# Patient Record
Sex: FEMALE | Race: WHITE | Employment: PART TIME | URBAN - METROPOLITAN AREA
[De-identification: names, ages, dates, MRNs, and addresses within clinical notes are randomized per-mention and may not be internally consistent; named-entity substitution may affect disease eponyms.]

---

## 2017-05-16 ENCOUNTER — TRANSFERRED RECORDS (OUTPATIENT)
Dept: HEALTH INFORMATION MANAGEMENT | Facility: CLINIC | Age: 52
End: 2017-05-16

## 2018-01-31 ENCOUNTER — TRANSFERRED RECORDS (OUTPATIENT)
Dept: HEALTH INFORMATION MANAGEMENT | Facility: CLINIC | Age: 53
End: 2018-01-31

## 2018-07-30 LAB — MAMMOGRAM: NORMAL

## 2019-04-10 ENCOUNTER — TRANSFERRED RECORDS (OUTPATIENT)
Dept: HEALTH INFORMATION MANAGEMENT | Facility: CLINIC | Age: 54
End: 2019-04-10

## 2019-04-17 ENCOUNTER — TRANSFERRED RECORDS (OUTPATIENT)
Dept: HEALTH INFORMATION MANAGEMENT | Facility: CLINIC | Age: 54
End: 2019-04-17

## 2019-09-11 ENCOUNTER — TELEPHONE (OUTPATIENT)
Dept: DERMATOLOGY | Facility: CLINIC | Age: 54
End: 2019-09-11

## 2019-09-11 NOTE — TELEPHONE ENCOUNTER
[facility] Call Center    Phone Message    May a detailed message be left on voicemail: yes    Reason for Call: Other: pt sent an email to request an appointment with  Dr. Webb  I am requesting a visit with a dermatologist who can serve both the skin and hair.  Current diagnosis lichen planopilaris for which I am taking Cellcept.  Will need to check labs for that prescription.  I can share that with you.    I am doing a share code with [employer] at [facility] but it is limited time? Share Code [id number]. May need to figure out another way to share those tests with you.    Had Basil Cell Carcinoma years ago.  Needing a skin check annual.  Action Taken: Message routed to:  [facility]

## 2019-09-20 NOTE — TELEPHONE ENCOUNTER
FUTURE VISIT INFORMATION      FUTURE VISIT INFORMATION:    Date: 10.29.19    Time: 9:25    Location:  Derm  REFERRAL INFORMATION:    Referring provider: Dr. Zackery Hernandez    Referring providers clinic: [facility]    Reason for visit/diagnosis:  hairloss, possible skin check Hx BCC    RECORDS REQUESTED FROM:       Clinic name Comments Records Status Imaging Status   [facility] Dr. Zackery Hernandez Received (sent for scanning) Also a copy is in today's folder in the collaboration space    [facility] 7.20.18 Dr. Bharath Louie/Dr. Seema Valencia In Epic/CE                                Action Angela Delgado 9.20.19 3:35 pm   Action Taken Faxed records request to [facility]     Action Angela Delgado 10.28.19 11:35 am   Action Taken Called [facility] as records had not yet been received. They faxed records to me and I sent them to [facility] for scanning into chart.

## 2019-09-30 ENCOUNTER — DOCUMENTATION ONLY (OUTPATIENT)
Dept: CARE COORDINATION | Facility: CLINIC | Age: 54
End: 2019-09-30

## 2019-10-29 ENCOUNTER — PRE VISIT (OUTPATIENT)
Dept: DERMATOLOGY | Facility: CLINIC | Age: 54
End: 2019-10-29

## 2019-10-29 ENCOUNTER — OFFICE VISIT (OUTPATIENT)
Dept: DERMATOLOGY | Facility: CLINIC | Age: 54
End: 2019-10-29
Payer: COMMERCIAL

## 2019-10-29 DIAGNOSIS — L65.9 LOSS OF HAIR: ICD-10-CM

## 2019-10-29 DIAGNOSIS — Z79.899 ENCOUNTER FOR LONG-TERM (CURRENT) USE OF HIGH-RISK MEDICATION: ICD-10-CM

## 2019-10-29 DIAGNOSIS — L66.12 FRONTAL FIBROSING ALOPECIA: Primary | ICD-10-CM

## 2019-10-29 DIAGNOSIS — Z85.828 HISTORY OF NONMELANOMA SKIN CANCER: ICD-10-CM

## 2019-10-29 LAB
ALBUMIN SERPL-MCNC: 3.8 G/DL (ref 3.4–5)
ALP SERPL-CCNC: 59 U/L (ref 40–150)
ALT SERPL W P-5'-P-CCNC: 21 U/L (ref 0–50)
ANION GAP SERPL CALCULATED.3IONS-SCNC: 2 MMOL/L (ref 3–14)
AST SERPL W P-5'-P-CCNC: 14 U/L (ref 0–45)
BASOPHILS # BLD AUTO: 0 10E9/L (ref 0–0.2)
BASOPHILS NFR BLD AUTO: 0.5 %
BILIRUB SERPL-MCNC: 0.5 MG/DL (ref 0.2–1.3)
BUN SERPL-MCNC: 9 MG/DL (ref 7–30)
CALCIUM SERPL-MCNC: 8.6 MG/DL (ref 8.5–10.1)
CHLORIDE SERPL-SCNC: 105 MMOL/L (ref 94–109)
CO2 SERPL-SCNC: 31 MMOL/L (ref 20–32)
CREAT SERPL-MCNC: 0.74 MG/DL (ref 0.52–1.04)
DIFFERENTIAL METHOD BLD: ABNORMAL
EOSINOPHIL # BLD AUTO: 0 10E9/L (ref 0–0.7)
EOSINOPHIL NFR BLD AUTO: 0.8 %
ERYTHROCYTE [DISTWIDTH] IN BLOOD BY AUTOMATED COUNT: 12.1 % (ref 10–15)
FERRITIN SERPL-MCNC: 95 NG/ML (ref 8–252)
GFR SERPL CREATININE-BSD FRML MDRD: >90 ML/MIN/{1.73_M2}
GLUCOSE SERPL-MCNC: 87 MG/DL (ref 70–99)
HCT VFR BLD AUTO: 45.8 % (ref 35–47)
HGB BLD-MCNC: 14.9 G/DL (ref 11.7–15.7)
IMM GRANULOCYTES # BLD: 0 10E9/L (ref 0–0.4)
IMM GRANULOCYTES NFR BLD: 0 %
LYMPHOCYTES # BLD AUTO: 1.3 10E9/L (ref 0.8–5.3)
LYMPHOCYTES NFR BLD AUTO: 34.3 %
MCH RBC QN AUTO: 30.7 PG (ref 26.5–33)
MCHC RBC AUTO-ENTMCNC: 32.5 G/DL (ref 31.5–36.5)
MCV RBC AUTO: 94 FL (ref 78–100)
MONOCYTES # BLD AUTO: 0.4 10E9/L (ref 0–1.3)
MONOCYTES NFR BLD AUTO: 9.7 %
NEUTROPHILS # BLD AUTO: 2 10E9/L (ref 1.6–8.3)
NEUTROPHILS NFR BLD AUTO: 54.7 %
NRBC # BLD AUTO: 0 10*3/UL
NRBC BLD AUTO-RTO: 0 /100
PLATELET # BLD AUTO: 228 10E9/L (ref 150–450)
POTASSIUM SERPL-SCNC: 3.9 MMOL/L (ref 3.4–5.3)
PROT SERPL-MCNC: 6.7 G/DL (ref 6.8–8.8)
RBC # BLD AUTO: 4.85 10E12/L (ref 3.8–5.2)
SODIUM SERPL-SCNC: 138 MMOL/L (ref 133–144)
WBC # BLD AUTO: 3.7 10E9/L (ref 4–11)

## 2019-10-29 RX ORDER — MYCOPHENOLATE MOFETIL 500 MG/1
TABLET ORAL
Refills: 1 | COMMUNITY
Start: 2019-04-12

## 2019-10-29 RX ORDER — MYCOPHENOLATE MOFETIL 500 MG/1
1000 TABLET, FILM COATED ORAL 2 TIMES DAILY
Qty: 240 TABLET | Refills: 3 | Status: SHIPPED | OUTPATIENT
Start: 2019-10-29 | End: 2019-12-28

## 2019-10-29 RX ORDER — DESONIDE 0.5 MG/G
CREAM TOPICAL 2 TIMES DAILY
Qty: 15 G | Refills: 1 | Status: SHIPPED | OUTPATIENT
Start: 2019-10-29

## 2019-10-29 RX ORDER — ESTRADIOL 0.04 MG/D
PATCH TRANSDERMAL
COMMUNITY
Start: 2019-10-28

## 2019-10-29 RX ORDER — CLOBETASOL PROPIONATE 0.5 MG/ML
SOLUTION TOPICAL
Refills: 11 | COMMUNITY
Start: 2018-12-07

## 2019-10-29 RX ORDER — TRETINOIN 0.25 MG/G
CREAM TOPICAL
Qty: 45 G | Refills: 3 | Status: SHIPPED | OUTPATIENT
Start: 2019-10-29

## 2019-10-29 ASSESSMENT — PAIN SCALES - GENERAL: PAINLEVEL: NO PAIN (0)

## 2019-10-29 NOTE — NURSING NOTE
Chief Complaint   Patient presents with     Hair/Scalp Problem     Salud is here today for Hairloss. She is currently takign Cellcept for her hairloss.      Carie Alvarez LPN

## 2019-10-29 NOTE — PATIENT INSTRUCTIONS
Alternate cellcept dose to 1000 mg (two tablets) twice per day, then 2 tablets (1000 mg) in the morning and 1 tablet (500mg) at night. Do this for 2 weeks, then if continue to do well, can decrease to 1000 mg (two tablets) in the morning and one at night. You can continue to remove one tablet every other day every 2 weeks as tolerated.  If you start to develop a flare in your FFA, please increase your dose most recent dose increase.    Apply a thin desonide cream to eyebrows once per day  Apply tretinoin to forehead and face, pea-sized amount every other night and then nightly as tolerated.

## 2019-10-29 NOTE — LETTER
10/29/2019       RE: Salud Rock  [address] [place] [address]     Dear Colleague,    Thank you for referring your patient, Salud Rock, to the [facility] DERMATOLOGY at [facility]. Please see a copy of my visit note below.    [facility] Dermatology Note    Dermatology Problem List:  1. Frontal fibrosing alopecia  -current treatment: CellCept 1000mg BID (with plan to taper 1 tablet every other day [id number]), ketoconazole shampoo 3x per week, desonide cream to eyebrows daily, and tretinoin 0.025% cream nightly   -CellCept safety labs obtained 10/29/19  2. Hx BCC on face    Encounter Date: Oct 29, 2019    CC:   Chief Complaint   Patient presents with     Hair/Scalp Problem     Salud is here today for Hairloss. She is currently takign Cellcept for her hairloss.      History of Present Illness:  Ms. Salud Rock is a 53 year old female who presents for evaluation of her frontal fibrosing alopecia. She was previously seen by Dr. Moreno [facility] and presents today to establish care and for monitoring/management of her CellCept.  She initially noticed her hair loss approximately  4-5 years ago with her frontal scalp hairline and pubic hair becoming very pruritic and red. At first, she thought she may be allergic to her family's Earlene tree, but when this did not resolve, she presented to her dermatologist for evaluation. A punch bx was performed, and she was diagnosed with LPP.  She is currently treating her hair loss with ketoconazole shampoo every other day and CellCept 1,000 mg BID.  Since starting the cellcept, her scalp pruritus has significantly improved. She continues to intermittently experience pruritus, but denies any scalp pain or burning.  Over the past few years, she has developed thinning along the lateral aspect of her eyebrows. She denies hair loss on eyelashes or body hair elsewhere.     She states that she has a history of  a BCC and would like to have a skin check either today or within the next few months. Today, she denies any new, painful, non-healing or bleeding lesions of concern she would like to address today.     Past Medical History:   There is no problem list on file for this patient.    No past medical history on file.  No past surgical history on file.    Social History:  Patient reports that she has never smoked. She has never used smokeless tobacco.    Family History:  No family history on file.    Medications:  Current Outpatient Medications   Medication Sig Dispense Refill     CELLCEPT (BRAND) 500 MG tablet Take 2 tablets (1,000 mg) by mouth 2 times daily 240 tablet 3     cholecalciferol (D 5000) 125 MCG (5000 UT) CAPS Take 2,000 Units by mouth       clobetasol (TEMOVATE) 0.05 % external solution APPLY TOPICALLY TO SCALP TWO TIMES A DAY  11     desonide (DESOWEN) 0.05 % external cream Apply topically 2 times daily 15 g 1     estradiol (CLIMARA) 0.0375 MG/24HR weekly patch        mycophenolate (GENERIC EQUIVALENT) 500 MG tablet TAKE TWO TABLETS BY MOUTH TWICE A DAY  1     OMEGA 3-6-9 FATTY ACIDS PO Take 1 capsule by mouth       tretinoin (RETIN-A) 0.025 % external cream Use a pea-sized amount every night 45 g 3        No Known Allergies    Review of Systems:  -As per HPI  -Constitutional: Otherwise feeling well today, in usual state of health.  -HEENT: Patient denies nonhealing oral sores.  -Skin: As above in HPI. No additional skin concerns.    Physical exam:  Vitals: There were no vitals taken for this visit.  GEN: This is a well developed, well-nourished female in no acute distress, in a pleasant mood.    SKIN: Focused examination of the face, scalp and b/l fingernails was performed.  -Prado skin type: I  -recession of frontotemporal scalp hairline   -perifollicular scale and dilation of the follicular ostia along the frontal scalp hairline with minimal perifollicular erythema  -decreased hair density present  along lateral aspect of b/l eyebrows with light pink patches present along the b/l eyebrows  -no hair loss loss present on eyelashes  -mild longitudinal ridging present along b/l fingernails, most prominent along the b/l thumb nails  -No other lesions of concern on areas examined.     Impression/Plan:  1. Frontal fibrosing alopecia.  LPPAI score 0.33 today for perifollicular scale. Discussed with Ms. Rock the similar appearance under the microscope between FFA and LPP. The clinical distribution of the hair loss and involvement helps to distinguish between these two diseases. Given that her hair loss is predominantly along her frontotemporal scalp with involvement of her eyebrows, her disease is clinically consistent with FFA.    Will continue CellCept as her FFA is well controlled with this medication. Will plan to taper this medication as tolerated 1 tablet, every other day Q2 weeks (I.e 1000mg BID alternating with 1000mg in the AM and 500mg in the PM). Last cellcept safety labs checked on 4/2019, will repeat safety labs today    Continue to wash scalp with ketoconazole shampoo 3x per week    Apply desonide 0.05% cream to b/l eyebrows 1-2x per day    Apply tretinoin 0.025% cream to b/l eyebrows nightly as tolerated    2. History of nonmelanoma skin cancer, BCC on the face    Discussed with  that we will focus on her hair loss today and plan to perform a skin check at her next visit.     Sun precaution was advised including the use of sun screens of SPF 30 or higher, sun protective clothing, and avoidance of tanning beds    CC Zackery Hernandez MD  [facility]  [address] [place] [phone] on close of this encounter.    Follow-up in 6 months, earlier for new or changing lesions.     Dr. Peace staffed the patient.    Staff Involved:  Resident(Dr. Chandler)/Staff(Dr. Peace)    Staff Physician Comments:   I saw and evaluated the patient with the resident and I agree with  the assessment and plan.  I was present for the examination. I have made edits if needed.    Carlos Peace MD  Staff Dermatologist and Dermatopathologist  , Department of Dermatology

## 2019-10-29 NOTE — PROGRESS NOTES
[facility] Dermatology Note    Dermatology Problem List:  1. Frontal fibrosing alopecia  -current treatment: CellCept 1000mg BID (with plan to taper 1 tablet every other day [id number]), ketoconazole shampoo 3x per week, desonide cream to eyebrows daily, and tretinoin 0.025% cream nightly   -CellCept safety labs obtained 10/29/19  2. Hx BCC on face    Encounter Date: Oct 29, 2019    CC:   Chief Complaint   Patient presents with     Hair/Scalp Problem     Salud is here today for Hairloss. She is currently takign Cellcept for her hairloss.      History of Present Illness:  Ms. Salud Rock is a 53 year old female who presents for evaluation of her frontal fibrosing alopecia. [facility] and presents today to establish care and for monitoring/management of her CellCept.  She initially noticed her hair loss approximately  4-5 years ago with her frontal scalp hairline and pubic hair becoming very pruritic and red. At first, she thought she may be allergic to her family's Earlene tree, but when this did not resolve, she presented to her dermatologist for evaluation. A punch bx was performed, and she was diagnosed with LPP.  She is currently treating her hair loss with ketoconazole shampoo every other day and CellCept 1,000 mg BID.  Since starting the cellcept, her scalp pruritus has significantly improved. She continues to intermittently experience pruritus, but denies any scalp pain or burning.  Over the past few years, she has developed thinning along the lateral aspect of her eyebrows. She denies hair loss on eyelashes or body hair elsewhere.     She states that she has a history of a BCC and would like to have a skin check either today or within the next few months. Today, she denies any new, painful, non-healing or bleeding lesions of concern she would like to address today.     Past Medical History:   There is no problem list on file for this  patient.    No past medical history on file.  No past surgical history on file.    Social History:  Patient reports that she has never smoked. She has never used smokeless tobacco.    Family History:  No family history on file.    Medications:  Current Outpatient Medications   Medication Sig Dispense Refill     CELLCEPT (BRAND) 500 MG tablet Take 2 tablets (1,000 mg) by mouth 2 times daily 240 tablet 3     cholecalciferol (D 5000) 125 MCG (5000 UT) CAPS Take 2,000 Units by mouth       clobetasol (TEMOVATE) 0.05 % external solution APPLY TOPICALLY TO SCALP TWO TIMES A DAY  11     desonide (DESOWEN) 0.05 % external cream Apply topically 2 times daily 15 g 1     estradiol (CLIMARA) 0.0375 MG/24HR weekly patch        mycophenolate (GENERIC EQUIVALENT) 500 MG tablet TAKE TWO TABLETS BY MOUTH TWICE A DAY  1     OMEGA 3-6-9 FATTY ACIDS PO Take 1 capsule by mouth       tretinoin (RETIN-A) 0.025 % external cream Use a pea-sized amount every night 45 g 3        No Known Allergies    Review of Systems:  -As per HPI  -Constitutional: Otherwise feeling well today, in usual state of health.  -HEENT: Patient denies nonhealing oral sores.  -Skin: As above in HPI. No additional skin concerns.    Physical exam:  Vitals: There were no vitals taken for this visit.  GEN: This is a well developed, well-nourished female in no acute distress, in a pleasant mood.    SKIN: Focused examination of the face, scalp and b/l fingernails was performed.  -Prado skin type: I  -recession of frontotemporal scalp hairline   -perifollicular scale and dilation of the follicular ostia along the frontal scalp hairline with minimal perifollicular erythema  -decreased hair density present along lateral aspect of b/l eyebrows with light pink patches present along the b/l eyebrows  -no hair loss loss present on eyelashes  -mild longitudinal ridging present along b/l fingernails, most prominent along the b/l thumb nails  -No other lesions of concern on  areas examined.     Impression/Plan:  1. Frontal fibrosing alopecia.  LPPAI score 0.33 today for perifollicular scale. Discussed with Ms. Rock the similar appearance under the microscope between FFA and LPP. The clinical distribution of the hair loss and involvement helps to distinguish between these two diseases. Given that her hair loss is predominantly along her frontotemporal scalp with involvement of her eyebrows, her disease is clinically consistent with FFA.    Will continue CellCept as her FFA is well controlled with this medication. Will plan to taper this medication as tolerated 1 tablet, every other day Q2 weeks (I.e 1000mg BID alternating with 1000mg in the AM and 500mg in the PM). Last cellcept safety labs checked on 4/2019, will repeat safety labs today    Continue to wash scalp with ketoconazole shampoo 3x per week    Apply desonide 0.05% cream to b/l eyebrows 1-2x per day    Apply tretinoin 0.025% cream to b/l eyebrows nightly as tolerated    2. History of nonmelanoma skin cancer, BCC on the face    Discussed with  that we will focus on her hair loss today and plan to perform a skin check at her next visit.     Sun precaution was advised including the use of sun screens of SPF 30 or higher, sun protective clothing, and avoidance of tanning beds    CC Zackery Hernandez MD  [facility]  [address] [place] [address] on close of this encounter.    Follow-up in 6 months, earlier for new or changing lesions.     Dr. Peace staffed the patient.    Staff Involved:  Resident(Dr. Chandler)/Staff(Dr. Peace)    Staff Physician Comments:   I saw and evaluated the patient with the resident and I agree with the assessment and plan.  I was present for the examination. I have made edits if needed.    Carlos Peace MD  Staff Dermatologist and Dermatopathologist  , Department of Dermatology

## 2019-10-31 ENCOUNTER — TELEPHONE (OUTPATIENT)
Dept: DERMATOLOGY | Facility: CLINIC | Age: 54
End: 2019-10-31

## 2019-10-31 NOTE — TELEPHONE ENCOUNTER
Prior Authorization Retail Medication Request    Medication/Dose: tretinoin (RETIN-A) 0.025 % external cream  ICD code (if different than what is on RX):    Previously Tried and Failed:  ketoconazole shampoo   Rationale:  Applying to eyebrows    Covermymeds Key: [id number]

## 2019-11-05 NOTE — TELEPHONE ENCOUNTER
Central Prior Authorization Team   Phone: [phone]      PA Initiation    Medication: tretinoin (RETIN-A) 0.025 % external cream-PA initiated  Insurance Company: [employer] - [email or web address] [phone] Fax [phone]  Pharm[facility] Filling the Rx: [facility] [address]  Filling Pharmacy Phone: [phone]  Filling Pharmacy Fax:    Start Date: 11/5/2019

## 2019-11-06 NOTE — TELEPHONE ENCOUNTER
PRIOR AUTHORIZATION DENIED    Medication: tretinoin (RETIN-A) 0.025 % external cream-PA denied    Denial Date: 11/5/2019    Denial Rational:         Appeal Information:

## 2020-03-11 ENCOUNTER — HEALTH MAINTENANCE LETTER (OUTPATIENT)
Age: 55
End: 2020-03-11

## 2021-01-03 ENCOUNTER — HEALTH MAINTENANCE LETTER (OUTPATIENT)
Age: 56
End: 2021-01-03

## 2021-04-25 ENCOUNTER — HEALTH MAINTENANCE LETTER (OUTPATIENT)
Age: 56
End: 2021-04-25

## 2021-10-10 ENCOUNTER — HEALTH MAINTENANCE LETTER (OUTPATIENT)
Age: 56
End: 2021-10-10

## 2022-05-22 ENCOUNTER — HEALTH MAINTENANCE LETTER (OUTPATIENT)
Age: 57
End: 2022-05-22

## 2022-09-18 ENCOUNTER — HEALTH MAINTENANCE LETTER (OUTPATIENT)
Age: 57
End: 2022-09-18

## 2023-01-29 ENCOUNTER — HEALTH MAINTENANCE LETTER (OUTPATIENT)
Age: 58
End: 2023-01-29

## 2023-06-04 ENCOUNTER — HEALTH MAINTENANCE LETTER (OUTPATIENT)
Age: 58
End: 2023-06-04